# Patient Record
Sex: FEMALE | Race: WHITE
[De-identification: names, ages, dates, MRNs, and addresses within clinical notes are randomized per-mention and may not be internally consistent; named-entity substitution may affect disease eponyms.]

---

## 2020-01-29 ENCOUNTER — HOSPITAL ENCOUNTER (OUTPATIENT)
Age: 60
End: 2020-01-29
Payer: MEDICAID

## 2020-01-29 DIAGNOSIS — G62.9: Primary | ICD-10-CM

## 2020-01-29 PROCEDURE — 95886 MUSC TEST DONE W/N TEST COMP: CPT

## 2020-01-29 PROCEDURE — 95912 NRV CNDJ TEST 11-12 STUDIES: CPT

## 2022-09-08 ENCOUNTER — HOSPITAL ENCOUNTER (EMERGENCY)
Age: 62
LOS: 1 days | Discharge: HOME | End: 2022-09-09
Payer: MEDICAID

## 2022-09-08 VITALS
OXYGEN SATURATION: 99 % | HEART RATE: 86 BPM | TEMPERATURE: 97.16 F | DIASTOLIC BLOOD PRESSURE: 79 MMHG | SYSTOLIC BLOOD PRESSURE: 143 MMHG | RESPIRATION RATE: 15 BRPM

## 2022-09-08 VITALS — BODY MASS INDEX: 21.7 KG/M2

## 2022-09-08 DIAGNOSIS — E87.1: ICD-10-CM

## 2022-09-08 DIAGNOSIS — R10.9: ICD-10-CM

## 2022-09-08 DIAGNOSIS — G43.909: Primary | ICD-10-CM

## 2022-09-08 DIAGNOSIS — E87.6: ICD-10-CM

## 2022-09-08 PROCEDURE — 85025 COMPLETE CBC W/AUTO DIFF WBC: CPT

## 2022-09-08 PROCEDURE — A4216 STERILE WATER/SALINE, 10 ML: HCPCS

## 2022-09-08 PROCEDURE — 99284 EMERGENCY DEPT VISIT MOD MDM: CPT

## 2022-09-08 PROCEDURE — 81001 URINALYSIS AUTO W/SCOPE: CPT

## 2022-09-08 PROCEDURE — 83690 ASSAY OF LIPASE: CPT

## 2022-09-08 PROCEDURE — 80053 COMPREHEN METABOLIC PANEL: CPT

## 2022-09-09 VITALS
HEART RATE: 117 BPM | OXYGEN SATURATION: 98 % | DIASTOLIC BLOOD PRESSURE: 94 MMHG | TEMPERATURE: 98.2 F | RESPIRATION RATE: 17 BRPM | SYSTOLIC BLOOD PRESSURE: 137 MMHG

## 2022-09-09 VITALS — SYSTOLIC BLOOD PRESSURE: 126 MMHG | HEART RATE: 98 BPM | OXYGEN SATURATION: 98 % | DIASTOLIC BLOOD PRESSURE: 73 MMHG

## 2022-09-09 LAB
ALANINE AMINOTRANSFER ALT/SGPT: 27 U/L (ref 13–56)
ALBUMIN SERPL-MCNC: 3.8 G/DL (ref 3.2–5)
ALKALINE PHOSPHATASE: 35 U/L (ref 45–117)
ANION GAP: 11 (ref 5–15)
AST(SGOT): 24 U/L (ref 15–37)
BUN SERPL-MCNC: 11 MG/DL (ref 7–18)
BUN/CREAT RATIO: 13.1 RATIO (ref 10–20)
CALCIUM SERPL-MCNC: 9 MG/DL (ref 8.5–10.1)
CARBON DIOXIDE: 27 MMOL/L (ref 21–32)
CHLORIDE: 95 MMOL/L (ref 98–107)
DEPRECATED RDW RBC: 33.7 FL (ref 35.1–43.9)
ERYTHROCYTE [DISTWIDTH] IN BLOOD: 11 % (ref 11.6–14.6)
EST GLOM FILT RATE - AFR AMER: 88 ML/MIN (ref 60–?)
ESTIMATED CREATININE CLEARANCE: 63.34 ML/MIN
GLOBULIN: 3.2 G/DL (ref 2.2–4.2)
GLUCOSE: 133 MG/DL (ref 74–106)
HCT VFR BLD AUTO: 34.7 % (ref 37–47)
HEMOGLOBIN: 12.4 G/DL (ref 12–15)
HGB BLD-MCNC: 12.4 G/DL (ref 12–15)
IMMATURE GRANULOCYTES COUNT: 0.01 X10^3/UL (ref 0–0)
KETONE-DIPSTICK: 50 MG/DL
LIPASE: 185 U/L (ref 73–393)
MCV RBC: 83.4 FL (ref 81–99)
MEAN CORP HGB CONC: 35.7 G/DL (ref 32–36)
MEAN PLATELET VOL.: 9.7 FL (ref 6.2–12)
MUCOUS THREADS URNS QL MICRO: (no result) /HPF
NRBC FLAGGED BY ANALYZER: 0 % (ref 0–5)
PLATELET # BLD: 178 K/MM3 (ref 150–450)
PLATELET COUNT: 178 K/MM3 (ref 150–450)
POTASSIUM: 3.1 MMOL/L (ref 3.5–5.1)
RBC # BLD AUTO: 4.16 M/MM3 (ref 4.2–5.4)
RBC DISTRIBUTION WIDTH CV: 11 % (ref 11.6–14.6)
RBC DISTRIBUTION WIDTH SD: 33.7 FL (ref 35.1–43.9)
RBC UR QL: (no result) /HPF (ref 0–5)
RBC UR QL: 25 /UL
SP GR UR: 1.02 (ref 1–1.03)
SQUAMOUS URNS QL MICRO: (no result) /HPF (ref 5–10)
URINE PRESERVATIVE: (no result)
WBC # BLD AUTO: 4.4 K/MM3 (ref 4.4–11)
WHITE BLOOD COUNT: 4.4 K/MM3 (ref 4.4–11)

## 2023-05-17 LAB
ALANINE AMINOTRANSFERASE (SGPT) (U/L) IN SER/PLAS: 20 U/L (ref 7–45)
ALBUMIN (G/DL) IN SER/PLAS: 4.6 G/DL (ref 3.4–5)
ALKALINE PHOSPHATASE (U/L) IN SER/PLAS: 40 U/L (ref 33–136)
ANION GAP IN SER/PLAS: 12 MMOL/L (ref 10–20)
ANTI-DNA (DS): 3 IU/ML
ASPARTATE AMINOTRANSFERASE (SGOT) (U/L) IN SER/PLAS: 32 U/L (ref 9–39)
BASOPHILS (10*3/UL) IN BLOOD BY AUTOMATED COUNT: 0.08 X10E9/L (ref 0–0.1)
BASOPHILS/100 LEUKOCYTES IN BLOOD BY AUTOMATED COUNT: 1.6 % (ref 0–2)
BILIRUBIN TOTAL (MG/DL) IN SER/PLAS: 0.7 MG/DL (ref 0–1.2)
C REACTIVE PROTEIN (MG/L) IN SER/PLAS: 0.38 MG/DL
CALCIUM (MG/DL) IN SER/PLAS: 9.7 MG/DL (ref 8.6–10.6)
CARBON DIOXIDE, TOTAL (MMOL/L) IN SER/PLAS: 27 MMOL/L (ref 21–32)
CHLORIDE (MMOL/L) IN SER/PLAS: 104 MMOL/L (ref 98–107)
CHOLESTEROL (MG/DL) IN SER/PLAS: 273 MG/DL (ref 0–199)
CHOLESTEROL IN HDL (MG/DL) IN SER/PLAS: 59 MG/DL
CHOLESTEROL/HDL RATIO: 4.6
COMPLEMENT C3 (MG/DL) IN SER/PLAS: 117 MG/DL (ref 87–200)
COMPLEMENT C4 (MG/DL) IN SER/PLAS: 27 MG/DL (ref 10–50)
CREATININE (MG/DL) IN SER/PLAS: 1.13 MG/DL (ref 0.5–1.05)
EOSINOPHILS (10*3/UL) IN BLOOD BY AUTOMATED COUNT: 0.14 X10E9/L (ref 0–0.7)
EOSINOPHILS/100 LEUKOCYTES IN BLOOD BY AUTOMATED COUNT: 2.8 % (ref 0–6)
ERYTHROCYTE DISTRIBUTION WIDTH (RATIO) BY AUTOMATED COUNT: 11.9 % (ref 11.5–14.5)
ERYTHROCYTE MEAN CORPUSCULAR HEMOGLOBIN CONCENTRATION (G/DL) BY AUTOMATED: 32.6 G/DL (ref 32–36)
ERYTHROCYTE MEAN CORPUSCULAR VOLUME (FL) BY AUTOMATED COUNT: 90 FL (ref 80–100)
ERYTHROCYTES (10*6/UL) IN BLOOD BY AUTOMATED COUNT: 4.27 X10E12/L (ref 4–5.2)
GFR FEMALE: 54 ML/MIN/1.73M2
GLUCOSE (MG/DL) IN SER/PLAS: 88 MG/DL (ref 74–99)
HEMATOCRIT (%) IN BLOOD BY AUTOMATED COUNT: 38.3 % (ref 36–46)
HEMOGLOBIN (G/DL) IN BLOOD: 12.5 G/DL (ref 12–16)
IMMATURE GRANULOCYTES/100 LEUKOCYTES IN BLOOD BY AUTOMATED COUNT: 0.2 % (ref 0–0.9)
LDL: 200 MG/DL (ref 0–99)
LEUKOCYTES (10*3/UL) IN BLOOD BY AUTOMATED COUNT: 5.1 X10E9/L (ref 4.4–11.3)
LYMPHOCYTES (10*3/UL) IN BLOOD BY AUTOMATED COUNT: 1.83 X10E9/L (ref 1.2–4.8)
LYMPHOCYTES/100 LEUKOCYTES IN BLOOD BY AUTOMATED COUNT: 36 % (ref 13–44)
MONOCYTES (10*3/UL) IN BLOOD BY AUTOMATED COUNT: 0.49 X10E9/L (ref 0.1–1)
MONOCYTES/100 LEUKOCYTES IN BLOOD BY AUTOMATED COUNT: 9.6 % (ref 2–10)
NEUTROPHILS (10*3/UL) IN BLOOD BY AUTOMATED COUNT: 2.53 X10E9/L (ref 1.2–7.7)
NEUTROPHILS/100 LEUKOCYTES IN BLOOD BY AUTOMATED COUNT: 49.8 % (ref 40–80)
NRBC (PER 100 WBCS) BY AUTOMATED COUNT: 0 /100 WBC (ref 0–0)
PLATELETS (10*3/UL) IN BLOOD AUTOMATED COUNT: 229 X10E9/L (ref 150–450)
POTASSIUM (MMOL/L) IN SER/PLAS: 4.6 MMOL/L (ref 3.5–5.3)
PROTEIN TOTAL: 7.4 G/DL (ref 6.4–8.2)
SEDIMENTATION RATE, ERYTHROCYTE: 7 MM/H (ref 0–30)
SODIUM (MMOL/L) IN SER/PLAS: 138 MMOL/L (ref 136–145)
TRIGLYCERIDE (MG/DL) IN SER/PLAS: 68 MG/DL (ref 0–149)
UREA NITROGEN (MG/DL) IN SER/PLAS: 26 MG/DL (ref 6–23)
VLDL: 14 MG/DL (ref 0–40)

## 2023-11-18 PROBLEM — Z51.81 ENCOUNTER FOR MONITORING OF HYDROXYCHLOROQUINE THERAPY: Status: ACTIVE | Noted: 2023-11-18

## 2023-11-18 PROBLEM — N30.10 INTERSTITIAL CYSTITIS: Status: ACTIVE | Noted: 2023-11-18

## 2023-11-18 PROBLEM — G62.9 PERIPHERAL NEUROPATHY: Status: ACTIVE | Noted: 2023-11-18

## 2023-11-18 PROBLEM — M70.62 TROCHANTERIC BURSITIS OF LEFT HIP: Status: ACTIVE | Noted: 2023-11-18

## 2023-11-18 PROBLEM — L72.3 SEBACEOUS CYST: Status: ACTIVE | Noted: 2018-06-29

## 2023-11-18 PROBLEM — Z79.899 ENCOUNTER FOR MONITORING OF HYDROXYCHLOROQUINE THERAPY: Status: ACTIVE | Noted: 2023-11-18

## 2023-11-18 PROBLEM — R21 RASH AND OTHER NONSPECIFIC SKIN ERUPTION: Status: ACTIVE | Noted: 2018-06-29

## 2023-11-18 PROBLEM — M32.9 SYSTEMIC LUPUS ERYTHEMATOSUS (MULTI): Status: ACTIVE | Noted: 2023-11-18

## 2023-11-18 PROBLEM — K21.9 GASTROESOPHAGEAL REFLUX DISEASE: Status: ACTIVE | Noted: 2023-11-18

## 2023-11-18 PROBLEM — R76.8 SS-A ANTIBODY POSITIVE: Status: ACTIVE | Noted: 2023-11-18

## 2023-11-18 PROBLEM — K14.6 BURNING MOUTH SYNDROME: Status: ACTIVE | Noted: 2023-11-18

## 2023-11-18 RX ORDER — HYDROXYCHLOROQUINE SULFATE 200 MG/1
1 TABLET, FILM COATED ORAL DAILY
COMMUNITY
Start: 2018-11-12 | End: 2024-05-14

## 2023-11-18 RX ORDER — ACETAMINOPHEN 500 MG
TABLET ORAL
COMMUNITY
Start: 2019-05-01

## 2023-11-18 RX ORDER — GABAPENTIN 300 MG/1
600 CAPSULE ORAL
COMMUNITY
Start: 2019-06-19 | End: 2023-11-20 | Stop reason: SDUPTHER

## 2023-11-18 RX ORDER — PANTOPRAZOLE SODIUM 40 MG/1
1 TABLET, DELAYED RELEASE ORAL DAILY
COMMUNITY
Start: 2021-05-17

## 2023-11-20 ENCOUNTER — TELEPHONE (OUTPATIENT)
Dept: RHEUMATOLOGY | Facility: CLINIC | Age: 63
End: 2023-11-20

## 2023-11-20 ENCOUNTER — OFFICE VISIT (OUTPATIENT)
Dept: RHEUMATOLOGY | Facility: CLINIC | Age: 63
End: 2023-11-20
Payer: COMMERCIAL

## 2023-11-20 VITALS
SYSTOLIC BLOOD PRESSURE: 133 MMHG | TEMPERATURE: 98.1 F | WEIGHT: 142.7 LBS | BODY MASS INDEX: 23.78 KG/M2 | HEART RATE: 89 BPM | DIASTOLIC BLOOD PRESSURE: 82 MMHG | HEIGHT: 65 IN

## 2023-11-20 DIAGNOSIS — M32.9 SYSTEMIC LUPUS ERYTHEMATOSUS, UNSPECIFIED SLE TYPE, UNSPECIFIED ORGAN INVOLVEMENT STATUS (MULTI): Primary | ICD-10-CM

## 2023-11-20 DIAGNOSIS — Z79.899 ENCOUNTER FOR MONITORING OF HYDROXYCHLOROQUINE THERAPY: ICD-10-CM

## 2023-11-20 DIAGNOSIS — K14.6 BURNING MOUTH SYNDROME: Primary | ICD-10-CM

## 2023-11-20 DIAGNOSIS — Z51.81 ENCOUNTER FOR MONITORING OF HYDROXYCHLOROQUINE THERAPY: ICD-10-CM

## 2023-11-20 LAB
NON-UH HIE A/G RATIO: 1.4
NON-UH HIE ALB: 4 G/DL (ref 3.4–5)
NON-UH HIE ALK PHOS: 43 UNIT/L (ref 45–117)
NON-UH HIE BASO COUNT: 0.02 X1000 (ref 0–0.2)
NON-UH HIE BASOS %: 0.6 %
NON-UH HIE BILIRUBIN, TOTAL: 0.5 MG/DL (ref 0.3–1.2)
NON-UH HIE BUN/CREAT RATIO: 13.6
NON-UH HIE BUN: 15 MG/DL (ref 9–23)
NON-UH HIE C-REACTIVE PROTEIN, QUANTITATIVE: <0.4 MG/DL (ref 0–0.9)
NON-UH HIE CALCIUM: 9.4 MG/DL (ref 8.7–10.4)
NON-UH HIE CALCULATED OSMOLALITY: 280 MOSM/KG (ref 275–295)
NON-UH HIE CHLORIDE: 104 MMOL/L (ref 98–107)
NON-UH HIE CO2, VENOUS: 30 MMOL/L (ref 20–31)
NON-UH HIE CREATININE: 1.1 MG/DL (ref 0.5–0.8)
NON-UH HIE DIFF?: NO
NON-UH HIE EOS COUNT: 0.07 X1000 (ref 0–0.5)
NON-UH HIE EOSIN %: 1.6 %
NON-UH HIE GFR AA: >60
NON-UH HIE GLOBULIN: 2.9 G/DL
NON-UH HIE GLOMERULAR FILTRATION RATE: 50 ML/MIN/1.73M?
NON-UH HIE GLUCOSE: 92 MG/DL (ref 74–106)
NON-UH HIE GOT: 30 UNIT/L (ref 15–37)
NON-UH HIE GPT: 32 UNIT/L (ref 10–49)
NON-UH HIE HCT: 36.3 % (ref 36–46)
NON-UH HIE HGB: 12.2 G/DL (ref 12–16)
NON-UH HIE INSTR WBC: 4.2
NON-UH HIE K: 4.6 MMOL/L (ref 3.5–5.1)
NON-UH HIE LYMPH %: 26.6 %
NON-UH HIE LYMPH COUNT: 1.11 X1000 (ref 1.2–4.8)
NON-UH HIE MCH: 29.6 PG (ref 27–34)
NON-UH HIE MCHC: 33.8 G/DL (ref 32–37)
NON-UH HIE MCV: 87.6 FL (ref 80–100)
NON-UH HIE MONO %: 7.2 %
NON-UH HIE MONO COUNT: 0.3 X1000 (ref 0.1–1)
NON-UH HIE MPV: 8.6 FL (ref 7.4–10.4)
NON-UH HIE NA: 140 MMOL/L (ref 135–145)
NON-UH HIE NEUTROPHIL %: 64 %
NON-UH HIE NEUTROPHIL COUNT (ANC): 2.66 X1000 (ref 1.4–8.8)
NON-UH HIE NUCLEATED RBC: 0 /100WBC
NON-UH HIE PLATELET: 180 X10 (ref 150–450)
NON-UH HIE RBC: 4.14 X10 (ref 4.2–5.4)
NON-UH HIE RDW: 12.6 % (ref 11.5–14.5)
NON-UH HIE SED RATE WESTERGREN: 11 MM/HR (ref 0–30)
NON-UH HIE TOTAL PROTEIN: 6.9 G/DL (ref 5.7–8.2)
NON-UH HIE WBC: 4.2 X10 (ref 4.5–11)

## 2023-11-20 PROCEDURE — 99214 OFFICE O/P EST MOD 30 MIN: CPT | Performed by: INTERNAL MEDICINE

## 2023-11-20 PROCEDURE — 1036F TOBACCO NON-USER: CPT | Performed by: INTERNAL MEDICINE

## 2023-11-20 PROCEDURE — 1125F AMNT PAIN NOTED PAIN PRSNT: CPT | Performed by: INTERNAL MEDICINE

## 2023-11-20 PROCEDURE — 1160F RVW MEDS BY RX/DR IN RCRD: CPT | Performed by: INTERNAL MEDICINE

## 2023-11-20 PROCEDURE — 1159F MED LIST DOCD IN RCRD: CPT | Performed by: INTERNAL MEDICINE

## 2023-11-20 RX ORDER — GABAPENTIN 300 MG/1
600 CAPSULE ORAL
Qty: 60 CAPSULE | Refills: 11 | Status: SHIPPED | OUTPATIENT
Start: 2023-11-20 | End: 2023-12-06 | Stop reason: SDUPTHER

## 2023-11-20 ASSESSMENT — ENCOUNTER SYMPTOMS
NUMBNESS: 0
BACK PAIN: 0
MYALGIAS: 0
FATIGUE: 1
ARTHRALGIAS: 1
FEVER: 0
COLOR CHANGE: 0
BRUISES/BLEEDS EASILY: 0
DIZZINESS: 0
JOINT SWELLING: 0
HEADACHES: 0
COUGH: 0
UNEXPECTED WEIGHT CHANGE: 0
WEAKNESS: 0
SHORTNESS OF BREATH: 0

## 2023-11-20 ASSESSMENT — PATIENT HEALTH QUESTIONNAIRE - PHQ9
2. FEELING DOWN, DEPRESSED OR HOPELESS: NOT AT ALL
SUM OF ALL RESPONSES TO PHQ9 QUESTIONS 1 AND 2: 0
1. LITTLE INTEREST OR PLEASURE IN DOING THINGS: NOT AT ALL

## 2023-11-20 NOTE — PATIENT INSTRUCTIONS
It was a pleasure to see you today  Please call if your symptoms worsen  Please review your summary for education and reminders.  Follow up at your next appointment.    If you had labs/xrays done today, you will be able to view on [employer].   We will contact you when the results are reviewed for further discussion.  Please note that you may receive your results before I have had a chance to review.  Please know I will be contacting you for discussion  Homegoing instructions for all patient  A healthy lifestyle helps chronic diseases  These are all the goals you should strive to improve your overall health   Blood pressure <130/85   BMI of <30 or waist circumference that is 1/2 of your height   Fasting blood sugar <107 (if you are diabetic, aim for an A1c <6.4%_   LDL cholesterol <130   Avoid smoking   Manage your stress   Get your preventive exams   Get your immunizations

## 2023-11-20 NOTE — PROGRESS NOTES
Subjective   Patient ID: Romana Hogan is a 63 y.o. female who presents for Lupus.  Pt reports she has been stable for the most part.   Has fatigue but she thinks it may be because she watches her young grandchildren.    She notes she does get a rash on her face when she is tired.  Reports daughter is being evaluated for an autoimmune disease as well    Lupus  Associated symptoms include arthralgias, fatigue and a rash. Pertinent negatives include no congestion, coughing, fever, headaches, joint swelling, myalgias, numbness or weakness.     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    Gastroesophageal reflux disease 11/18/2023    Interstitial cystitis 11/18/2023    Peripheral neuropathy 11/18/2023    SS-A antibody positive 11/18/2023    Systemic lupus erythematosus (CMS/HCC) 11/18/2023    Trochanteric bursitis of left hip 11/18/2023    Encounter for monitoring of hydroxychloroquine therapy 11/18/2023    Rash and other nonspecific skin eruption 06/29/2018    Sebaceous cyst 06/29/2018     Allergies   Allergen Reactions    Pollen Extracts Other     Sneezing, watery eyes    Latex Rash     Current Outpatient Medications   Medication Instructions    acetaminophen (Tylenol Extra Strength) 500 mg tablet oral    gabapentin (NEURONTIN) 600 mg, oral, Daily before breakfast, And 1 tablet in evening    hydroxychloroquine (Plaquenil) 200 mg tablet 1 tablet, oral, Daily    pantoprazole (Protonix) 40 mg EC tablet 1 tablet, oral, Daily    phenylephrine-acetaminophen 5-325 mg tablet oral, Every 4 hours RT       Review of Systems   Constitutional:  Positive for fatigue. Negative for fever and unexpected weight change.   HENT:  Negative for congestion.    Respiratory:  Negative for cough and shortness of breath.    Cardiovascular:  Negative for leg swelling.   Musculoskeletal:  Positive for arthralgias. Negative for back pain, joint swelling and myalgias.   Skin:  Positive for rash.  "Negative for color change.   Neurological:  Negative for dizziness, weakness, numbness and headaches.   Hematological:  Does not bruise/bleed easily.       Objective  /82   Pulse 89   Temp 36.7 °C (98.1 °F)   Ht 1.651 m (5' 5\")   Wt 64.7 kg (142 lb 11.2 oz)   BMI 23.75 kg/m²     Physical Exam  Vitals reviewed.   Constitutional:       General: She is not in acute distress.     Appearance: Normal appearance.   HENT:      Head: Normocephalic and atraumatic.   Eyes:      Conjunctiva/sclera: Conjunctivae normal.   Pulmonary:      Effort: Pulmonary effort is normal. No respiratory distress.   Musculoskeletal:         General: No swelling, tenderness or deformity.      Right lower leg: No edema.      Left lower leg: No edema.      Comments: No synovitis of examined joints   Skin:     Findings: Erythema present. No rash.      Comments: Malar rash   Neurological:      General: No focal deficit present.      Mental Status: She is alert.   Psychiatric:         Mood and Affect: Mood normal.         Assessment/Plan   Problem List Items Addressed This Visit             ICD-10-CM    Systemic lupus erythematosus (CMS/[facility]) - Primary M32.9     Lupus on HCQ therapy.   Pt is largely stable and symptoms may be due to overactivity.  Continue meds  Labs ordered today to monitor for increased disease activity, end organ manifestations and to monitor for any drug toxicities due to chronic medications           Relevant Orders    Anti-DNA Antibody, Double-Stranded    C3 Complement    C4 Complement    CBC and Auto Differential    Comprehensive Metabolic Panel    C-Reactive Protein    Sedimentation Rate    Encounter for monitoring of hydroxychloroquine therapy Z51.81, Z79.899     You are on chronic plaquenil.     Make sure you see your eye doctor yearly.  If you need an eye doctor, let me know and I can place a referral    Plaquenil is dosed based on your weight.   We will make sure your dose is below the maximum daily dose of " 5mg/kg            Follow up 6 mo

## 2023-11-20 NOTE — LETTER
November 20, 2023     Mattie Escamilla MD  [address] [facility] [address]    Patient: Romana Hogan   YOB: 1960   Date of Visit: 11/20/2023       Dear Dr. Mattie Escamilla MD:    Thank you for referring Romana Hogan to me for evaluation. Below are my notes for this consultation.  If you have questions, please do not hesitate to call me. I look forward to following your patient along with you.       Sincerely,     Leah Galindo MD      CC: No Recipients  ______________________________________________________________________________________    Subjective  Patient ID: Romana Hogan is a 63 y.o. female who presents for Lupus.  Pt reports she has been stable for the most part.   Has fatigue but she thinks it may be because she watches her young grandchildren.    She notes she does get a rash on her face when she is tired.  Reports daughter is being evaluated for an autoimmune disease as well    Lupus  Associated symptoms include arthralgias, fatigue and a rash. Pertinent negatives include no congestion, coughing, fever, headaches, joint swelling, myalgias, numbness or weakness.     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   • Gastroesophageal reflux disease 11/18/2023   • Interstitial cystitis 11/18/2023   • Peripheral neuropathy 11/18/2023   • SS-A antibody positive 11/18/2023   • Systemic lupus erythematosus (CMS/HCC) 11/18/2023   • Trochanteric bursitis of left hip 11/18/2023   • Encounter for monitoring of hydroxychloroquine therapy 11/18/2023   • Rash and other nonspecific skin eruption 06/29/2018   • Sebaceous cyst 06/29/2018     Allergies   Allergen Reactions   • Pollen Extracts Other     Sneezing, watery eyes   • Latex Rash     Current Outpatient Medications   Medication Instructions   • acetaminophen (Tylenol Extra Strength) 500 mg tablet oral   • gabapentin (NEURONTIN) 600 mg, oral, Daily before breakfast, And 1 tablet in evening   •  "hydroxychloroquine (Plaquenil) 200 mg tablet 1 tablet, oral, Daily   • pantoprazole (Protonix) 40 mg EC tablet 1 tablet, oral, Daily   • phenylephrine-acetaminophen 5-325 mg tablet oral, Every 4 hours RT       Review of Systems   Constitutional:  Positive for fatigue. Negative for fever and unexpected weight change.   HENT:  Negative for congestion.    Respiratory:  Negative for cough and shortness of breath.    Cardiovascular:  Negative for leg swelling.   Musculoskeletal:  Positive for arthralgias. Negative for back pain, joint swelling and myalgias.   Skin:  Positive for rash. Negative for color change.   Neurological:  Negative for dizziness, weakness, numbness and headaches.   Hematological:  Does not bruise/bleed easily.       Objective /82   Pulse 89   Temp 36.7 °C (98.1 °F)   Ht 1.651 m (5' 5\")   Wt 64.7 kg (142 lb 11.2 oz)   BMI 23.75 kg/m²     Physical Exam  Vitals reviewed.   Constitutional:       General: She is not in acute distress.     Appearance: Normal appearance.   HENT:      Head: Normocephalic and atraumatic.   Eyes:      Conjunctiva/sclera: Conjunctivae normal.   Pulmonary:      Effort: Pulmonary effort is normal. No respiratory distress.   Musculoskeletal:         General: No swelling, tenderness or deformity.      Right lower leg: No edema.      Left lower leg: No edema.      Comments: No synovitis of examined joints   Skin:     Findings: Erythema present. No rash.      Comments: Malar rash   Neurological:      General: No focal deficit present.      Mental Status: She is alert.   Psychiatric:         Mood and Affect: Mood normal.         Assessment/Plan  Problem List Items Addressed This Visit             ICD-10-CM    Systemic lupus erythematosus (CMS/[facility]) - Primary M32.9     Lupus on HCQ therapy.   Pt is largely stable and symptoms may be due to overactivity.  Continue meds  Labs ordered today to monitor for increased disease activity, end organ manifestations and to monitor for " any drug toxicities due to chronic medications           Relevant Orders    Anti-DNA Antibody, Double-Stranded    C3 Complement    C4 Complement    CBC and Auto Differential    Comprehensive Metabolic Panel    C-Reactive Protein    Sedimentation Rate    Encounter for monitoring of hydroxychloroquine therapy Z51.81, Z79.899     You are on chronic plaquenil.     Make sure you see your eye doctor yearly.  If you need an eye doctor, let me know and I can place a referral    Plaquenil is dosed based on your weight.   We will make sure your dose is below the maximum daily dose of 5mg/kg            Follow up 6 mo

## 2023-11-20 NOTE — ASSESSMENT & PLAN NOTE
Lupus on HCQ therapy.   Pt is largely stable and symptoms may be due to overactivity.  Continue meds  Labs ordered today to monitor for increased disease activity, end organ manifestations and to monitor for any drug toxicities due to chronic medications

## 2023-11-21 LAB — NON-UH HIE ANTI-DNA: NEGATIVE

## 2023-11-23 LAB
NON-UH HIE COMPLEMENT C3: 101 MG/DL (ref 90–180)
NON-UH HIE COMPLEMENT C4: 16 MG/DL (ref 10–40)

## 2023-12-06 ENCOUNTER — TELEPHONE (OUTPATIENT)
Dept: RHEUMATOLOGY | Facility: CLINIC | Age: 63
End: 2023-12-06
Payer: COMMERCIAL

## 2023-12-06 DIAGNOSIS — K14.6 BURNING MOUTH SYNDROME: ICD-10-CM

## 2023-12-06 RX ORDER — GABAPENTIN 300 MG/1
600 CAPSULE ORAL
Qty: 90 CAPSULE | Refills: 11 | Status: SHIPPED | OUTPATIENT
Start: 2023-12-06 | End: 2024-12-05

## 2023-12-06 NOTE — TELEPHONE ENCOUNTER
Romana called in saying that when her gabapentin was called in on 11/20/2023 she on received #60 and she normally gets #90 since she takes it TID. Can you please resend the medication to [facility] on [address] [facility] in [place], [place]. She only has 10 days left. Thanks.

## 2024-05-14 DIAGNOSIS — M32.9 SYSTEMIC LUPUS ERYTHEMATOSUS, UNSPECIFIED SLE TYPE, UNSPECIFIED ORGAN INVOLVEMENT STATUS (MULTI): Primary | ICD-10-CM

## 2024-05-14 RX ORDER — HYDROXYCHLOROQUINE SULFATE 200 MG/1
200 TABLET, FILM COATED ORAL DAILY
Qty: 90 TABLET | Refills: 3 | Status: SHIPPED | OUTPATIENT
Start: 2024-05-14 | End: 2025-05-14

## 2024-05-20 ENCOUNTER — APPOINTMENT (OUTPATIENT)
Dept: RHEUMATOLOGY | Facility: CLINIC | Age: 64
End: 2024-05-20
Payer: COMMERCIAL

## 2024-09-22 RX ORDER — FLUTICASONE PROPIONATE 50 MCG
2 SPRAY, SUSPENSION (ML) NASAL DAILY
COMMUNITY
Start: 2024-04-11

## 2024-09-22 RX ORDER — TRIAMCINOLONE ACETONIDE 1 MG/G
CREAM TOPICAL 2 TIMES DAILY
COMMUNITY
Start: 2024-04-11

## 2024-09-25 ENCOUNTER — APPOINTMENT (OUTPATIENT)
Dept: RHEUMATOLOGY | Facility: CLINIC | Age: 64
End: 2024-09-25
Payer: COMMERCIAL

## 2024-09-25 ENCOUNTER — LAB (OUTPATIENT)
Dept: LAB | Facility: LAB | Age: 64
End: 2024-09-25
Payer: COMMERCIAL

## 2024-09-25 VITALS
SYSTOLIC BLOOD PRESSURE: 137 MMHG | WEIGHT: 142.9 LBS | OXYGEN SATURATION: 99 % | TEMPERATURE: 98 F | HEIGHT: 65 IN | DIASTOLIC BLOOD PRESSURE: 85 MMHG | HEART RATE: 75 BPM | BODY MASS INDEX: 23.81 KG/M2

## 2024-09-25 DIAGNOSIS — Z51.81 ENCOUNTER FOR MONITORING OF HYDROXYCHLOROQUINE THERAPY: ICD-10-CM

## 2024-09-25 DIAGNOSIS — M32.9 SYSTEMIC LUPUS ERYTHEMATOSUS, UNSPECIFIED SLE TYPE, UNSPECIFIED ORGAN INVOLVEMENT STATUS (MULTI): Primary | ICD-10-CM

## 2024-09-25 DIAGNOSIS — Z79.899 ENCOUNTER FOR MONITORING OF HYDROXYCHLOROQUINE THERAPY: ICD-10-CM

## 2024-09-25 DIAGNOSIS — Z23 NEED FOR VACCINATION: ICD-10-CM

## 2024-09-25 DIAGNOSIS — M32.9 SYSTEMIC LUPUS ERYTHEMATOSUS, UNSPECIFIED SLE TYPE, UNSPECIFIED ORGAN INVOLVEMENT STATUS (MULTI): ICD-10-CM

## 2024-09-25 PROBLEM — H25.811 COMBINED FORMS OF AGE-RELATED CATARACT, RIGHT EYE: Status: ACTIVE | Noted: 2024-09-25

## 2024-09-25 PROCEDURE — 90662 IIV NO PRSV INCREASED AG IM: CPT | Performed by: INTERNAL MEDICINE

## 2024-09-25 PROCEDURE — 85025 COMPLETE CBC W/AUTO DIFF WBC: CPT

## 2024-09-25 PROCEDURE — 86160 COMPLEMENT ANTIGEN: CPT

## 2024-09-25 PROCEDURE — 86140 C-REACTIVE PROTEIN: CPT

## 2024-09-25 PROCEDURE — 86225 DNA ANTIBODY NATIVE: CPT

## 2024-09-25 PROCEDURE — 80053 COMPREHEN METABOLIC PANEL: CPT

## 2024-09-25 PROCEDURE — 99214 OFFICE O/P EST MOD 30 MIN: CPT | Performed by: INTERNAL MEDICINE

## 2024-09-25 PROCEDURE — 36415 COLL VENOUS BLD VENIPUNCTURE: CPT

## 2024-09-25 PROCEDURE — 1036F TOBACCO NON-USER: CPT | Performed by: INTERNAL MEDICINE

## 2024-09-25 PROCEDURE — 1159F MED LIST DOCD IN RCRD: CPT | Performed by: INTERNAL MEDICINE

## 2024-09-25 PROCEDURE — 3008F BODY MASS INDEX DOCD: CPT | Performed by: INTERNAL MEDICINE

## 2024-09-25 PROCEDURE — 1124F ACP DISCUSS-NO DSCNMKR DOCD: CPT | Performed by: INTERNAL MEDICINE

## 2024-09-25 PROCEDURE — G0008 ADMIN INFLUENZA VIRUS VAC: HCPCS | Performed by: INTERNAL MEDICINE

## 2024-09-25 PROCEDURE — 85652 RBC SED RATE AUTOMATED: CPT

## 2024-09-25 PROCEDURE — 1160F RVW MEDS BY RX/DR IN RCRD: CPT | Performed by: INTERNAL MEDICINE

## 2024-09-25 ASSESSMENT — ENCOUNTER SYMPTOMS
WEAKNESS: 0
NUMBNESS: 0
JOINT SWELLING: 0
SHORTNESS OF BREATH: 0
COLOR CHANGE: 0
BACK PAIN: 0
FATIGUE: 0
COUGH: 0
FEVER: 0
ARTHRALGIAS: 1
MYALGIAS: 0

## 2024-09-25 ASSESSMENT — PATIENT HEALTH QUESTIONNAIRE - PHQ9
2. FEELING DOWN, DEPRESSED OR HOPELESS: NOT AT ALL
SUM OF ALL RESPONSES TO PHQ9 QUESTIONS 1 & 2: 0
1. LITTLE INTEREST OR PLEASURE IN DOING THINGS: NOT AT ALL

## 2024-09-25 NOTE — PATIENT INSTRUCTIONS
It was a pleasure to see you today  Please call if your symptoms worsen  Please review your summary for education and reminders.  Follow up at your next appointment.    If you had labs/xrays done today, you will be able to view on [employer].   We will contact you when the results are reviewed for further discussion.  Please note that you may receive your results before I have had a chance to review.  Please know I will be contacting you for discussion  Homegoing instructions for all patient  A healthy lifestyle helps chronic diseases  These are all the goals you should strive to improve your overall health   Blood pressure <130/85   BMI of <30 or waist circumference that is 1/2 of your height   Fasting blood sugar <107 (if you are diabetic, aim for an A1c <6.4%_   LDL cholesterol <130   Avoid smoking   Manage your stress   Get your preventive exams   Get your immunizations   Some guidelines for all patients with lupus  Wear sunscreen, even on cloudy days.   Sun can worsening rashes and cause fatigue and flares  Exercise.  Movement is medicine.   Even stretches and light yoga count  If you smoke, stop.  Smoking makes skin manifestations of lupus much harder to treat.  Lupus (and the medications you need to be on) increase infection risk.  Keep up to date with your vaccinations  Lupus increases heart and stroke risk.  Make sure you work to get your cholesterol and blood pressure under control. If you have diabetes, it is important to get your A1c below 7%  If you are on hydroxychloroquine (plaquenil), make sure you see your eye doctor regularly and get testing to monitor for any potential changes  Think about your bone health.   Osteoporosis is a risk, especially if you are or have been on steroids.      Mental health can be an issue.   Not only does having a diagnosis of lupus cause depression but lupus itself can be the cause of mental health issues.  If you feel you need help, please call me or your primary care  doctor.   We are here for you   Common Emergency Awareness Tips   IS IT A STROKE?   Act FAST and Check for these signs:   FACE Does the face look uneven?   ARM Does one arm drift down?   SPEECH Does their speech sound strange?   TIME Call 9-1-1 at any sign of stroke     Heart Attack Signs   Chest discomfort: Most heart attacks involve discomfort in the center of the chest and lasts more than a few minutes, or goes away and comes back. It can feel like uncomfortable pressure, squeezing, fullness or pain.   Discomfort in upper body: Symptoms can include pain or discomfort in one or both arms, back, neck, jaw or stomach.   Shortness of breath: With or without discomfort.   Other signs: Breaking out in a cold sweat, nausea, or lightheaded.   Remember, MINUTES DO MATTER. If you experience any of these heart attack warning signs, call 9-1-1 to get immediate medical attention!

## 2024-09-25 NOTE — ASSESSMENT & PLAN NOTE
You are on chronic plaquenil.     Make sure you see your eye doctor yearly.---last visit May 2024  Plaquenil is dosed based on your weight.   We will make sure your dose is below the maximum daily dose of 5mg/kg

## 2024-09-25 NOTE — PROGRESS NOTES
[facility] RHEUMATOLOGY     AND INTERNAL MEDICINE    RHEUMATOLOGY PROGRESS NOTE  Romana Hogan 64 y.o. female  Chief Complaint   Patient presents with    Follow-up    Lupus       SUBJECTIVE  Pt reports she has been stable.   Notes that with sun exposure, she gets small itchy red bumps that come and go very quickly.   Hasn't had any in 2 weeks.   Doesn't always use sunscreen.  No new areas of pain.   No worsening fatigue.  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    Gastroesophageal reflux disease 11/18/2023    Interstitial cystitis 11/18/2023    Peripheral neuropathy 11/18/2023    SS-A antibody positive 11/18/2023    Systemic lupus erythematosus (Multi) 11/18/2023    Trochanteric bursitis of left hip 11/18/2023    Encounter for monitoring of hydroxychloroquine therapy 11/18/2023    Rash and other nonspecific skin eruption 06/29/2018    Sebaceous cyst 06/29/2018     Past Medical History:   Diagnosis Date    Caffeine use     COVID-19 vaccine series declined     Pneumonia     History of pneumonia    Sleep disorder     History of sleep disorder     Current Outpatient Medications on File Prior to Visit   Medication Sig Dispense Refill    acetaminophen (Tylenol Extra Strength) 500 mg tablet Take by mouth.      fluticasone (Flonase) 50 mcg/actuation nasal spray Administer 2 sprays into each nostril once daily.      gabapentin (Neurontin) 300 mg capsule Take 2 capsules (600 mg) by mouth once daily in the morning. Take before meals. And 1 tablet in evening 90 capsule 11    hydroxychloroquine (Plaquenil) 200 mg tablet Take 1 tablet (200 mg) by mouth once  "daily. 90 tablet 3    pantoprazole (Protonix) 40 mg EC tablet Take 1 tablet (40 mg) by mouth once daily.      triamcinolone (Kenalog) 0.1 % cream Apply topically 2 times a day.      [DISCONTINUED] phenylephrine-acetaminophen 5-325 mg tablet Take by mouth every 4 hours.       No current facility-administered medications on file prior to visit.     Allergies   Allergen Reactions    Pollen Extracts Other     Sneezing, watery eyes    Latex Rash     Social History     Tobacco Use    Smoking status: Former     Current packs/day: 0.00     Types: Cigarettes     Quit date: 1984     Years since quittin.0    Smokeless tobacco: Never   Vaping Use    Vaping status: Never Used   Substance Use Topics    Alcohol use: Never    Drug use: Never     Review of Systems   Constitutional:  Negative for fatigue and fever.   Respiratory:  Negative for cough and shortness of breath.    Cardiovascular:  Negative for leg swelling.   Musculoskeletal:  Positive for arthralgias. Negative for back pain, gait problem, joint swelling and myalgias.   Skin:  Positive for rash. Negative for color change.   Neurological:  Negative for weakness and numbness.       PHYSICAL EXAM  /85   Pulse 75   Temp 36.7 °C (98 °F) (Temporal)   Ht 1.651 m (5' 5\")   Wt 64.8 kg (142 lb 14.4 oz)   SpO2 99%   BMI 23.78 kg/m²   Depression: Not at risk (2024)    PHQ-2     PHQ-2 Score: 0     Physical Exam  Vitals reviewed.   Constitutional:       General: She is not in acute distress.     Appearance: Normal appearance.   HENT:      Head: Normocephalic and atraumatic.   Eyes:      Conjunctiva/sclera: Conjunctivae normal.   Pulmonary:      Effort: Pulmonary effort is normal. No respiratory distress.   Musculoskeletal:         General: No swelling, tenderness or deformity.      Right lower leg: No edema.      Left lower leg: No edema.      Comments: No synovitis of examined joints   Skin:     Findings: Erythema present. No rash.      Comments: Malar " rash; no other rashes   Neurological:      General: No focal deficit present.      Mental Status: She is alert.   Psychiatric:         Mood and Affect: Mood normal.       Health Maintenance Due   Topic Date Due    Medicare Annual Wellness Visit (AWV)  Never done    Hepatitis C Screening  Never done    Zoster Vaccines (1 of 2) Never done    RSV Pregnant patients and/or  patients aged 60+ years (1 - 1-dose 60+ series) Never done    Diabetes Screening  10/31/2020    Influenza Vaccine (1) 09/01/2024       Assessment/plan  Assessment & Plan  Systemic lupus erythematosus, unspecified SLE type, unspecified organ involvement status (Multi)  Lupus is stable.   No signs of disease progression on exam.   Pt to use sunscreen more regularly.  Labs ordered today to monitor for increased disease activity, end organ manifestations and to monitor for any drug toxicities due to chronic medications    Orders:    Anti-DNA Antibody, Double-Stranded; Future    C3 Complement; Future    C4 Complement; Future    CBC and Auto Differential; Future    Comprehensive Metabolic Panel; Future    C-Reactive Protein; Future    Sedimentation Rate; Future    Encounter for monitoring of hydroxychloroquine therapy  You are on chronic plaquenil.     Make sure you see your eye doctor yearly.---last visit May 2024  Plaquenil is dosed based on your weight.   We will make sure your dose is below the maximum daily dose of 5mg/kg         Need for vaccination    Orders:    Flu vaccine, trivalent, preservative free, HIGH-DOSE, age 65y+ (Fluzone)      Follow up: ___6__months    Immunization History   Administered Date(s) Administered    Flu vaccine (IIV4), preservative free *Check age/dose* 11/18/2022    Flu vaccine, quadrivalent, high-dose, preservative free, age 65y+ (FLUZONE) 12/20/2023    Pneumococcal conjugate vaccine, 20-valent (PREVNAR 20) 08/09/2022    Tdap vaccine, age 7 year and older (BOOSTRIX, ADACEL) 08/29/2018

## 2024-09-25 NOTE — ASSESSMENT & PLAN NOTE
Lupus is stable.   No signs of disease progression on exam.   Pt to use sunscreen more regularly.  Labs ordered today to monitor for increased disease activity, end organ manifestations and to monitor for any drug toxicities due to chronic medications    Orders:    Anti-DNA Antibody, Double-Stranded; Future    C3 Complement; Future    C4 Complement; Future    CBC and Auto Differential; Future    Comprehensive Metabolic Panel; Future    C-Reactive Protein; Future    Sedimentation Rate; Future

## 2024-09-25 NOTE — LETTER
September 25, 2024     Mattie Escamilla MD  [address] [facility] [address]    Patient: Romana Hogan   YOB: 1960   Date of Visit: 9/25/2024       Dear Dr. Mattie Escamilla MD:    Thank you for referring Romana Hogan to me for evaluation. Below are my notes for this visit.  If you have questions, please do not hesitate to call me. I look forward to following your patient along with you.       Sincerely,     Leah Galindo MD      CC: No Recipients  ______________________________________________________________________________________                                                    [facility] RHEUMATOLOGY     AND INTERNAL MEDICINE    RHEUMATOLOGY PROGRESS NOTE  Romana Hogan 64 y.o. female  Chief Complaint   Patient presents with   • Follow-up   • Lupus       SUBJECTIVE  Pt reports she has been stable.   Notes that with sun exposure, she gets small itchy red bumps that come and go very quickly.   Hasn't had any in 2 weeks.   Doesn't always use sunscreen.  No new areas of pain.   No worsening fatigue.  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   • Gastroesophageal reflux disease 11/18/2023   • Interstitial cystitis 11/18/2023   • Peripheral neuropathy 11/18/2023   • SS-A antibody positive 11/18/2023   • Systemic lupus erythematosus (Multi) 11/18/2023   • Trochanteric bursitis of left hip 11/18/2023   • Encounter for monitoring of hydroxychloroquine therapy 11/18/2023   • Rash and other nonspecific skin eruption 06/29/2018   • Sebaceous cyst 06/29/2018     Past Medical History:   Diagnosis Date   • Caffeine use    • COVID-19 vaccine series declined    • Pneumonia      "History of pneumonia   • Sleep disorder     History of sleep disorder     Current Outpatient Medications on File Prior to Visit   Medication Sig Dispense Refill   • acetaminophen (Tylenol Extra Strength) 500 mg tablet Take by mouth.     • fluticasone (Flonase) 50 mcg/actuation nasal spray Administer 2 sprays into each nostril once daily.     • gabapentin (Neurontin) 300 mg capsule Take 2 capsules (600 mg) by mouth once daily in the morning. Take before meals. And 1 tablet in evening 90 capsule 11   • hydroxychloroquine (Plaquenil) 200 mg tablet Take 1 tablet (200 mg) by mouth once daily. 90 tablet 3   • pantoprazole (Protonix) 40 mg EC tablet Take 1 tablet (40 mg) by mouth once daily.     • triamcinolone (Kenalog) 0.1 % cream Apply topically 2 times a day.     • [DISCONTINUED] phenylephrine-acetaminophen 5-325 mg tablet Take by mouth every 4 hours.       No current facility-administered medications on file prior to visit.     Allergies   Allergen Reactions   • Pollen Extracts Other     Sneezing, watery eyes   • Latex Rash     Social History     Tobacco Use   • Smoking status: Former     Current packs/day: 0.00     Types: Cigarettes     Quit date: 1984     Years since quittin.0   • Smokeless tobacco: Never   Vaping Use   • Vaping status: Never Used   Substance Use Topics   • Alcohol use: Never   • Drug use: Never     Review of Systems   Constitutional:  Negative for fatigue and fever.   Respiratory:  Negative for cough and shortness of breath.    Cardiovascular:  Negative for leg swelling.   Musculoskeletal:  Positive for arthralgias. Negative for back pain, gait problem, joint swelling and myalgias.   Skin:  Positive for rash. Negative for color change.   Neurological:  Negative for weakness and numbness.       PHYSICAL EXAM  /85   Pulse 75   Temp 36.7 °C (98 °F) (Temporal)   Ht 1.651 m (5' 5\")   Wt 64.8 kg (142 lb 14.4 oz)   SpO2 99%   BMI 23.78 kg/m²   Depression: Not at risk (2024)    " PHQ-2    • PHQ-2 Score: 0     Physical Exam  Vitals reviewed.   Constitutional:       General: She is not in acute distress.     Appearance: Normal appearance.   HENT:      Head: Normocephalic and atraumatic.   Eyes:      Conjunctiva/sclera: Conjunctivae normal.   Pulmonary:      Effort: Pulmonary effort is normal. No respiratory distress.   Musculoskeletal:         General: No swelling, tenderness or deformity.      Right lower leg: No edema.      Left lower leg: No edema.      Comments: No synovitis of examined joints   Skin:     Findings: Erythema present. No rash.      Comments: Malar rash; no other rashes   Neurological:      General: No focal deficit present.      Mental Status: She is alert.   Psychiatric:         Mood and Affect: Mood normal.       Health Maintenance Due   Topic Date Due   • Medicare Annual Wellness Visit (AWV)  Never done   • Hepatitis C Screening  Never done   • Zoster Vaccines (1 of 2) Never done   • RSV Pregnant patients and/or  patients aged 60+ years (1 - 1-dose 60+ series) Never done   • Diabetes Screening  10/31/2020   • Influenza Vaccine (1) 09/01/2024       Assessment/plan  Assessment & Plan  Systemic lupus erythematosus, unspecified SLE type, unspecified organ involvement status (Multi)  Lupus is stable.   No signs of disease progression on exam.   Pt to use sunscreen more regularly.  Labs ordered today to monitor for increased disease activity, end organ manifestations and to monitor for any drug toxicities due to chronic medications    Orders:  •  Anti-DNA Antibody, Double-Stranded; Future  •  C3 Complement; Future  •  C4 Complement; Future  •  CBC and Auto Differential; Future  •  Comprehensive Metabolic Panel; Future  •  C-Reactive Protein; Future  •  Sedimentation Rate; Future    Encounter for monitoring of hydroxychloroquine therapy  You are on chronic plaquenil.     Make sure you see your eye doctor yearly.---last visit May 2024  Plaquenil is dosed based on your weight.    We will make sure your dose is below the maximum daily dose of 5mg/kg         Need for vaccination    Orders:  •  Flu vaccine, trivalent, preservative free, HIGH-DOSE, age 65y+ (Fluzone)      Follow up: ___6__months    Immunization History   Administered Date(s) Administered   • Flu vaccine (IIV4), preservative free *Check age/dose* 11/18/2022   • Flu vaccine, quadrivalent, high-dose, preservative free, age 65y+ (FLUZONE) 12/20/2023   • Pneumococcal conjugate vaccine, 20-valent (PREVNAR 20) 08/09/2022   • Tdap vaccine, age 7 year and older (BOOSTRIX, ADACEL) 08/29/2018

## 2024-09-26 LAB
ALBUMIN SERPL BCP-MCNC: 4.6 G/DL (ref 3.4–5)
ALP SERPL-CCNC: 44 U/L (ref 33–136)
ALT SERPL W P-5'-P-CCNC: 22 U/L (ref 7–45)
ANION GAP SERPL CALC-SCNC: 12 MMOL/L (ref 10–20)
AST SERPL W P-5'-P-CCNC: 24 U/L (ref 9–39)
BASOPHILS # BLD AUTO: 0.06 X10*3/UL (ref 0–0.1)
BASOPHILS NFR BLD AUTO: 1.3 %
BILIRUB SERPL-MCNC: 0.5 MG/DL (ref 0–1.2)
BUN SERPL-MCNC: 12 MG/DL (ref 6–23)
C3 SERPL-MCNC: 113 MG/DL (ref 87–200)
C4 SERPL-MCNC: 20 MG/DL (ref 10–50)
CALCIUM SERPL-MCNC: 9.8 MG/DL (ref 8.6–10.6)
CHLORIDE SERPL-SCNC: 98 MMOL/L (ref 98–107)
CO2 SERPL-SCNC: 28 MMOL/L (ref 21–32)
CREAT SERPL-MCNC: 0.9 MG/DL (ref 0.5–1.05)
CRP SERPL-MCNC: <0.1 MG/DL
DSDNA AB SER-ACNC: 3 IU/ML
EGFRCR SERPLBLD CKD-EPI 2021: 71 ML/MIN/1.73M*2
EOSINOPHIL # BLD AUTO: 0.17 X10*3/UL (ref 0–0.7)
EOSINOPHIL NFR BLD AUTO: 3.8 %
ERYTHROCYTE [DISTWIDTH] IN BLOOD BY AUTOMATED COUNT: 11.8 % (ref 11.5–14.5)
ERYTHROCYTE [SEDIMENTATION RATE] IN BLOOD BY WESTERGREN METHOD: 8 MM/H (ref 0–30)
GLUCOSE SERPL-MCNC: 93 MG/DL (ref 74–99)
HCT VFR BLD AUTO: 37.3 % (ref 36–46)
HGB BLD-MCNC: 12.4 G/DL (ref 12–16)
IMM GRANULOCYTES # BLD AUTO: 0.01 X10*3/UL (ref 0–0.7)
IMM GRANULOCYTES NFR BLD AUTO: 0.2 % (ref 0–0.9)
LYMPHOCYTES # BLD AUTO: 1.86 X10*3/UL (ref 1.2–4.8)
LYMPHOCYTES NFR BLD AUTO: 41.8 %
MCH RBC QN AUTO: 30 PG (ref 26–34)
MCHC RBC AUTO-ENTMCNC: 33.2 G/DL (ref 32–36)
MCV RBC AUTO: 90 FL (ref 80–100)
MONOCYTES # BLD AUTO: 0.36 X10*3/UL (ref 0.1–1)
MONOCYTES NFR BLD AUTO: 8.1 %
NEUTROPHILS # BLD AUTO: 1.99 X10*3/UL (ref 1.2–7.7)
NEUTROPHILS NFR BLD AUTO: 44.8 %
NRBC BLD-RTO: 0 /100 WBCS (ref 0–0)
PLATELET # BLD AUTO: 254 X10*3/UL (ref 150–450)
POTASSIUM SERPL-SCNC: 4.1 MMOL/L (ref 3.5–5.3)
PROT SERPL-MCNC: 7.2 G/DL (ref 6.4–8.2)
RBC # BLD AUTO: 4.13 X10*6/UL (ref 4–5.2)
SODIUM SERPL-SCNC: 134 MMOL/L (ref 136–145)
WBC # BLD AUTO: 4.5 X10*3/UL (ref 4.4–11.3)

## 2024-12-09 ENCOUNTER — TELEPHONE (OUTPATIENT)
Dept: PRIMARY CARE | Facility: CLINIC | Age: 64
End: 2024-12-09
Payer: COMMERCIAL

## 2024-12-09 DIAGNOSIS — K14.6 BURNING MOUTH SYNDROME: ICD-10-CM

## 2024-12-09 RX ORDER — GABAPENTIN 300 MG/1
600 CAPSULE ORAL
Qty: 90 CAPSULE | Refills: 11 | Status: SHIPPED | OUTPATIENT
Start: 2024-12-09 | End: 2025-12-09

## 2024-12-09 NOTE — TELEPHONE ENCOUNTER
Pt called for a refill on gabapentin (Neurontin) 300 mg capsule     New pharmacy, why she called early         [facility] [place] [id number] [address]  Phone: [phone] Fax: [phone]

## 2025-03-25 PROBLEM — Z23 NEED FOR VACCINATION: Status: RESOLVED | Noted: 2024-09-25 | Resolved: 2025-03-25

## 2025-03-25 RX ORDER — DICYCLOMINE HYDROCHLORIDE 10 MG/1
10 CAPSULE ORAL 4 TIMES DAILY
COMMUNITY
Start: 2024-10-28

## 2025-03-26 ENCOUNTER — APPOINTMENT (OUTPATIENT)
Dept: RHEUMATOLOGY | Facility: CLINIC | Age: 65
End: 2025-03-26
Payer: COMMERCIAL

## 2025-03-26 VITALS
HEART RATE: 81 BPM | DIASTOLIC BLOOD PRESSURE: 88 MMHG | HEIGHT: 65 IN | BODY MASS INDEX: 23.22 KG/M2 | OXYGEN SATURATION: 98 % | TEMPERATURE: 97.8 F | WEIGHT: 139.4 LBS | SYSTOLIC BLOOD PRESSURE: 141 MMHG

## 2025-03-26 DIAGNOSIS — Z51.81 ENCOUNTER FOR MONITORING OF HYDROXYCHLOROQUINE THERAPY: ICD-10-CM

## 2025-03-26 DIAGNOSIS — R79.9 ABNORMAL FINDING OF BLOOD CHEMISTRY, UNSPECIFIED: ICD-10-CM

## 2025-03-26 DIAGNOSIS — G43.019 INTRACTABLE MIGRAINE WITHOUT AURA AND WITHOUT STATUS MIGRAINOSUS: ICD-10-CM

## 2025-03-26 DIAGNOSIS — M32.9 SYSTEMIC LUPUS ERYTHEMATOSUS, UNSPECIFIED SLE TYPE, UNSPECIFIED ORGAN INVOLVEMENT STATUS (MULTI): Primary | ICD-10-CM

## 2025-03-26 DIAGNOSIS — Z79.899 ENCOUNTER FOR MONITORING OF HYDROXYCHLOROQUINE THERAPY: ICD-10-CM

## 2025-03-26 PROCEDURE — 1123F ACP DISCUSS/DSCN MKR DOCD: CPT | Performed by: INTERNAL MEDICINE

## 2025-03-26 PROCEDURE — 1160F RVW MEDS BY RX/DR IN RCRD: CPT | Performed by: INTERNAL MEDICINE

## 2025-03-26 PROCEDURE — 1158F ADVNC CARE PLAN TLK DOCD: CPT | Performed by: INTERNAL MEDICINE

## 2025-03-26 PROCEDURE — 1036F TOBACCO NON-USER: CPT | Performed by: INTERNAL MEDICINE

## 2025-03-26 PROCEDURE — 3008F BODY MASS INDEX DOCD: CPT | Performed by: INTERNAL MEDICINE

## 2025-03-26 PROCEDURE — 99214 OFFICE O/P EST MOD 30 MIN: CPT | Performed by: INTERNAL MEDICINE

## 2025-03-26 PROCEDURE — 1159F MED LIST DOCD IN RCRD: CPT | Performed by: INTERNAL MEDICINE

## 2025-03-26 RX ORDER — SUMATRIPTAN SUCCINATE 50 MG/1
50 TABLET ORAL ONCE AS NEEDED
Qty: 9 TABLET | Refills: 3 | Status: SHIPPED | OUTPATIENT
Start: 2025-03-26 | End: 2026-03-26

## 2025-03-26 ASSESSMENT — ENCOUNTER SYMPTOMS
ARTHRALGIAS: 1
PSYCHIATRIC NEGATIVE: 1
NUMBNESS: 0
GASTROINTESTINAL NEGATIVE: 1
EYES NEGATIVE: 1
JOINT SWELLING: 0
COUGH: 0
COLOR CHANGE: 0
FEVER: 0
BACK PAIN: 0
SHORTNESS OF BREATH: 0
WEAKNESS: 0
HEADACHES: 1
ENDOCRINE NEGATIVE: 1
FATIGUE: 1
MYALGIAS: 0

## 2025-03-26 ASSESSMENT — PATIENT HEALTH QUESTIONNAIRE - PHQ9
1. LITTLE INTEREST OR PLEASURE IN DOING THINGS: NOT AT ALL
SUM OF ALL RESPONSES TO PHQ9 QUESTIONS 1 AND 2: 0
2. FEELING DOWN, DEPRESSED OR HOPELESS: NOT AT ALL

## 2025-03-26 NOTE — ASSESSMENT & PLAN NOTE
You are on chronic plaquenil.     Make sure you see your eye doctor yearly.  Due in May  Plaquenil is dosed based on your weight.   We will make sure your dose is below the maximum daily dose of 5mg/kg

## 2025-03-26 NOTE — ASSESSMENT & PLAN NOTE
On plaquenil therapy.   No signs of disease progression on exam today  Labs ordered today to monitor for increased disease activity, end organ manifestations and to monitor for any drug toxicities due to chronic medications    Orders:    Anti-DNA Antibody, Double-Stranded; Future    C3 Complement; Future    C4 Complement; Future    CBC and Auto Differential; Future    Comprehensive Metabolic Panel; Future    C-Reactive Protein; Future    Sedimentation Rate; Future    Protein, Urine Random; Future    Hemoglobin A1C; Future    Follow Up In Rheumatology; Future

## 2025-03-26 NOTE — LETTER
2025     Mattie Escamilla MD  [address] [facility] [address]    Patient: Romana Hogan   YOB: 1960   Date of Visit: 3/26/2025       Dear Dr. Mattie Escamilla MD:    Thank you for referring Romana Hogan to me for evaluation. Below are my notes for this visit  If you have questions, please do not hesitate to call me. I look forward to following your patient along with you.       Sincerely,     Leah Galindo MD      CC: No Recipients  ______________________________________________________________________________________                                                    [facility] RHEUMATOLOGY     AND INTERNAL MEDICINE    RHEUMATOLOGY PROGRESS NOTE    Romana Hogan 64 y.o. female  :  1960  PCP:  Mattie Escamilla MD    Chief Complaint   Patient presents with   • Lupus       SUBJECTIVE  Pt reports that she is doing well.  No worsening joint pain or rashes.   Notes migraines with changes in barometric pressure and wonders if there is anything she can take for that.  When she gets a migraine, taking 2-3 days to recover.  No other new symptoms  Is tired but attributes to watching her grandkids 4 days a week      Records since last seen reviewed in [facility], [facility] and Community Record  Patient Active Problem List    Diagnosis Date Noted   • Combined forms of age-related cataract, right eye 2024   • Burning mouth syndrome 2023   • Gastroesophageal reflux disease 2023   • Interstitial cystitis 2023   • Peripheral neuropathy 2023   • SS-A antibody positive 2023   • Systemic lupus erythematosus (Multi) 2023   • Trochanteric bursitis of left hip 2023   • Encounter for monitoring of hydroxychloroquine therapy 2023   • Rash and other nonspecific skin eruption 2018   • Sebaceous cyst 2018     Past Medical History:   Diagnosis Date   • Caffeine use    • COVID-19 vaccine series declined    • Need for vaccination  2024   • Pneumonia     History of pneumonia   • Sleep disorder     History of sleep disorder     Current Outpatient Medications on File Prior to Visit   Medication Sig Dispense Refill   • dicyclomine (Bentyl) 10 mg capsule Take 1 capsule (10 mg) by mouth 4 times a day.     • acetaminophen (Tylenol Extra Strength) 500 mg tablet Take by mouth.     • fluticasone (Flonase) 50 mcg/actuation nasal spray Administer 2 sprays into each nostril once daily.     • gabapentin (Neurontin) 300 mg capsule Take 2 capsules (600 mg) by mouth once daily in the morning. Take before meals. And 1 tablet in evening 90 capsule 11   • hydroxychloroquine (Plaquenil) 200 mg tablet Take 1 tablet (200 mg) by mouth once daily. 90 tablet 3   • pantoprazole (Protonix) 40 mg EC tablet Take 1 tablet (40 mg) by mouth once daily.     • triamcinolone (Kenalog) 0.1 % cream Apply topically 2 times a day.       No current facility-administered medications on file prior to visit.     Allergies   Allergen Reactions   • Pollen Extracts Other     Sneezing, watery eyes   • Latex Rash     Social History     Tobacco Use   • Smoking status: Former     Current packs/day: 0.00     Types: Cigarettes     Quit date: 1984     Years since quittin.5   • Smokeless tobacco: Never   Vaping Use   • Vaping status: Never Used   Substance Use Topics   • Alcohol use: Never   • Drug use: Never     Review of Systems   Constitutional:  Positive for fatigue. Negative for fever.   HENT: Negative.     Eyes: Negative.    Respiratory:  Negative for cough and shortness of breath.    Cardiovascular:  Negative for leg swelling.   Gastrointestinal: Negative.    Endocrine: Negative.    Genitourinary: Negative.    Musculoskeletal:  Positive for arthralgias. Negative for back pain, gait problem, joint swelling and myalgias.   Skin:  Negative for color change and rash.   Neurological:  Positive for headaches. Negative for weakness and numbness.   Psychiatric/Behavioral: Negative.  "        PHYSICAL EXAM  /88   Pulse 81   Temp 36.6 °C (97.8 °F)   Ht 1.651 m (5' 5\")   Wt 63.2 kg (139 lb 6.4 oz)   SpO2 98%   BMI 23.20 kg/m²   Depression: Not at risk (3/26/2025)    PHQ-2    • PHQ-2 Score: 0     Physical Exam  Vitals reviewed.   Constitutional:       General: She is not in acute distress.     Appearance: Normal appearance.   HENT:      Head: Normocephalic and atraumatic.   Eyes:      General: No scleral icterus.     Extraocular Movements: Extraocular movements intact.      Conjunctiva/sclera: Conjunctivae normal.      Pupils: Pupils are equal, round, and reactive to light.   Neck:      Vascular: No carotid bruit.   Cardiovascular:      Rate and Rhythm: Normal rate and regular rhythm.      Pulses: Normal pulses.      Heart sounds: Normal heart sounds. No murmur heard.     No friction rub. No gallop.   Pulmonary:      Effort: Pulmonary effort is normal. No respiratory distress.      Breath sounds: Normal breath sounds.   Musculoskeletal:         General: No swelling, tenderness or deformity.      Cervical back: Normal range of motion.      Right lower leg: No edema.      Left lower leg: No edema.      Comments: No synovitis of examined joints   Skin:     Findings: No erythema or rash.   Neurological:      General: No focal deficit present.      Mental Status: She is alert.   Psychiatric:         Mood and Affect: Mood normal.           Health Maintenance Due   Topic Date Due   • Medicare Annual Wellness Visit (AWV)  Never done   • Hepatitis C Screening  Never done   • Zoster Vaccines (1 of 2) Never done   • Diabetes Screening  10/31/2020   • Mammogram  06/23/2022       Assessment/plan  Assessment & Plan  Systemic lupus erythematosus, unspecified SLE type, unspecified organ involvement status (Multi)  On plaquenil therapy.   No signs of disease progression on exam today  Labs ordered today to monitor for increased disease activity, end organ manifestations and to monitor for any drug " toxicities due to chronic medications    Orders:  •  Anti-DNA Antibody, Double-Stranded; Future  •  C3 Complement; Future  •  C4 Complement; Future  •  CBC and Auto Differential; Future  •  Comprehensive Metabolic Panel; Future  •  C-Reactive Protein; Future  •  Sedimentation Rate; Future  •  Protein, Urine Random; Future  •  Hemoglobin A1C; Future  •  Follow Up In Rheumatology; Future    Encounter for monitoring of hydroxychloroquine therapy  You are on chronic plaquenil.     Make sure you see your eye doctor yearly.  Due in May  Plaquenil is dosed based on your weight.   We will make sure your dose is below the maximum daily dose of 5mg/kg         Abnormal finding of blood chemistry, unspecified    Orders:  •  Hemoglobin A1C; Future    Intractable migraine without aura and without status migrainosus  Trial of imitrex and pt will let me know how this helps  Orders:  •  SUMAtriptan (Imitrex) 50 mg tablet; Take 1 tablet (50 mg) by mouth 1 time if needed for migraine. May repeat dose once in 2 hours if no relief.  Do not exceed 2 doses in 24 hours.      Follow up: ____6_months, sooner if change in symptoms    Immunization History   Administered Date(s) Administered   • Flu vaccine (IIV4), preservative free *Check age/dose* 11/18/2022   • Flu vaccine, quadrivalent, high-dose, preservative free, age 65y+ (FLUZONE) 12/20/2023   • Flu vaccine, trivalent, preservative free, HIGH-DOSE, age 65y+ (Fluzone) 09/25/2024   • Pneumococcal conjugate vaccine, 20-valent (PREVNAR 20) 08/09/2022   • Tdap vaccine, age 7 year and older (BOOSTRIX, ADACEL) 08/29/2018

## 2025-03-26 NOTE — PATIENT INSTRUCTIONS

## 2025-03-26 NOTE — PROGRESS NOTES
[facility] RHEUMATOLOGY     AND INTERNAL MEDICINE    RHEUMATOLOGY PROGRESS NOTE    Romana Hogan 64 y.o. female  :  1960  PCP:  Mattie Escamilla MD    Chief Complaint   Patient presents with    Lupus       SUBJECTIVE  Pt reports that she is doing well.  No worsening joint pain or rashes.   Notes migraines with changes in barometric pressure and wonders if there is anything she can take for that.  When she gets a migraine, taking 2-3 days to recover.  No other new symptoms  Is tired but attributes to watching her grandkids 4 days a week      Records since last seen reviewed in [facility], [facility] and [facility] Record  Patient Active Problem List    Diagnosis Date Noted    Combined forms of age-related cataract, right eye 2024    Burning mouth syndrome 2023    Gastroesophageal reflux disease 2023    Interstitial cystitis 2023    Peripheral neuropathy 2023    SS-A antibody positive 2023    Systemic lupus erythematosus (Multi) 2023    Trochanteric bursitis of left hip 2023    Encounter for monitoring of hydroxychloroquine therapy 2023    Rash and other nonspecific skin eruption 2018    Sebaceous cyst 2018     Past Medical History:   Diagnosis Date    Caffeine use     COVID-19 vaccine series declined     Need for vaccination 2024    Pneumonia     History of pneumonia    Sleep disorder     History of sleep disorder     Current Outpatient Medications on File Prior to Visit   Medication Sig Dispense Refill    dicyclomine (Bentyl) 10 mg capsule Take 1 capsule (10 mg) by mouth 4 times a day.      acetaminophen (Tylenol Extra Strength) 500 mg tablet Take by mouth.      fluticasone (Flonase) 50 mcg/actuation nasal spray Administer 2 sprays into each nostril once daily.      gabapentin (Neurontin) 300 mg capsule Take 2 capsules (600 mg) by mouth once daily in the morning. Take before meals. And  "1 tablet in evening 90 capsule 11    hydroxychloroquine (Plaquenil) 200 mg tablet Take 1 tablet (200 mg) by mouth once daily. 90 tablet 3    pantoprazole (Protonix) 40 mg EC tablet Take 1 tablet (40 mg) by mouth once daily.      triamcinolone (Kenalog) 0.1 % cream Apply topically 2 times a day.       No current facility-administered medications on file prior to visit.     Allergies   Allergen Reactions    Pollen Extracts Other     Sneezing, watery eyes    Latex Rash     Social History     Tobacco Use    Smoking status: Former     Current packs/day: 0.00     Types: Cigarettes     Quit date: 1984     Years since quittin.5    Smokeless tobacco: Never   Vaping Use    Vaping status: Never Used   Substance Use Topics    Alcohol use: Never    Drug use: Never     Review of Systems   Constitutional:  Positive for fatigue. Negative for fever.   HENT: Negative.     Eyes: Negative.    Respiratory:  Negative for cough and shortness of breath.    Cardiovascular:  Negative for leg swelling.   Gastrointestinal: Negative.    Endocrine: Negative.    Genitourinary: Negative.    Musculoskeletal:  Positive for arthralgias. Negative for back pain, gait problem, joint swelling and myalgias.   Skin:  Negative for color change and rash.   Neurological:  Positive for headaches. Negative for weakness and numbness.   Psychiatric/Behavioral: Negative.         PHYSICAL EXAM  /88   Pulse 81   Temp 36.6 °C (97.8 °F)   Ht 1.651 m (5' 5\")   Wt 63.2 kg (139 lb 6.4 oz)   SpO2 98%   BMI 23.20 kg/m²   Depression: Not at risk (3/26/2025)    PHQ-2     PHQ-2 Score: 0     Physical Exam  Vitals reviewed.   Constitutional:       General: She is not in acute distress.     Appearance: Normal appearance.   HENT:      Head: Normocephalic and atraumatic.   Eyes:      General: No scleral icterus.     Extraocular Movements: Extraocular movements intact.      Conjunctiva/sclera: Conjunctivae normal.      Pupils: Pupils are equal, round, and " reactive to light.   Neck:      Vascular: No carotid bruit.   Cardiovascular:      Rate and Rhythm: Normal rate and regular rhythm.      Pulses: Normal pulses.      Heart sounds: Normal heart sounds. No murmur heard.     No friction rub. No gallop.   Pulmonary:      Effort: Pulmonary effort is normal. No respiratory distress.      Breath sounds: Normal breath sounds.   Musculoskeletal:         General: No swelling, tenderness or deformity.      Cervical back: Normal range of motion.      Right lower leg: No edema.      Left lower leg: No edema.      Comments: No synovitis of examined joints   Skin:     Findings: No erythema or rash.   Neurological:      General: No focal deficit present.      Mental Status: She is alert.   Psychiatric:         Mood and Affect: Mood normal.           Health Maintenance Due   Topic Date Due    Medicare Annual Wellness Visit (AWV)  Never done    Hepatitis C Screening  Never done    Zoster Vaccines (1 of 2) Never done    Diabetes Screening  10/31/2020    Mammogram  06/23/2022       Assessment/plan  Assessment & Plan  Systemic lupus erythematosus, unspecified SLE type, unspecified organ involvement status (Multi)  On plaquenil therapy.   No signs of disease progression on exam today  Labs ordered today to monitor for increased disease activity, end organ manifestations and to monitor for any drug toxicities due to chronic medications    Orders:    Anti-DNA Antibody, Double-Stranded; Future    C3 Complement; Future    C4 Complement; Future    CBC and Auto Differential; Future    Comprehensive Metabolic Panel; Future    C-Reactive Protein; Future    Sedimentation Rate; Future    Protein, Urine Random; Future    Hemoglobin A1C; Future    Follow Up In Rheumatology; Future    Encounter for monitoring of hydroxychloroquine therapy  You are on chronic plaquenil.     Make sure you see your eye doctor yearly.  Due in May  Plaquenil is dosed based on your weight.   We will make sure your dose is  below the maximum daily dose of 5mg/kg         Abnormal finding of blood chemistry, unspecified    Orders:    Hemoglobin A1C; Future    Intractable migraine without aura and without status migrainosus  Trial of imitrex and pt will let me know how this helps  Orders:    SUMAtriptan (Imitrex) 50 mg tablet; Take 1 tablet (50 mg) by mouth 1 time if needed for migraine. May repeat dose once in 2 hours if no relief.  Do not exceed 2 doses in 24 hours.      Follow up: ____6_months, sooner if change in symptoms    Immunization History   Administered Date(s) Administered    Flu vaccine (IIV4), preservative free *Check age/dose* 11/18/2022    Flu vaccine, quadrivalent, high-dose, preservative free, age 65y+ (FLUZONE) 12/20/2023    Flu vaccine, trivalent, preservative free, HIGH-DOSE, age 65y+ (Fluzone) 09/25/2024    Pneumococcal conjugate vaccine, 20-valent (PREVNAR 20) 08/09/2022    Tdap vaccine, age 7 year and older (BOOSTRIX, ADACEL) 08/29/2018

## 2025-03-28 LAB
ALBUMIN SERPL-MCNC: 4.9 G/DL (ref 3.6–5.1)
ALP SERPL-CCNC: 47 U/L (ref 37–153)
ALT SERPL-CCNC: 19 U/L (ref 6–29)
ANION GAP SERPL CALCULATED.4IONS-SCNC: 8 MMOL/L (CALC) (ref 7–17)
AST SERPL-CCNC: 20 U/L (ref 10–35)
BASOPHILS # BLD AUTO: 48 CELLS/UL (ref 0–200)
BASOPHILS NFR BLD AUTO: 1.2 %
BILIRUB SERPL-MCNC: 0.6 MG/DL (ref 0.2–1.2)
BUN SERPL-MCNC: 12 MG/DL (ref 7–25)
C3 SERPL-MCNC: 105 MG/DL (ref 83–193)
C4 SERPL-MCNC: 15 MG/DL (ref 15–57)
CALCIUM SERPL-MCNC: 10 MG/DL (ref 8.6–10.4)
CHLORIDE SERPL-SCNC: 96 MMOL/L (ref 98–110)
CO2 SERPL-SCNC: 28 MMOL/L (ref 20–32)
CREAT SERPL-MCNC: 0.94 MG/DL (ref 0.5–1.05)
CREAT UR-MCNC: 77 MG/DL (ref 20–275)
CRP SERPL-MCNC: <3 MG/L
DSDNA AB SER-ACNC: 4 IU/ML
EGFRCR SERPLBLD CKD-EPI 2021: 67 ML/MIN/1.73M2
EOSINOPHIL # BLD AUTO: 128 CELLS/UL (ref 15–500)
EOSINOPHIL NFR BLD AUTO: 3.2 %
ERYTHROCYTE [DISTWIDTH] IN BLOOD BY AUTOMATED COUNT: 11.8 % (ref 11–15)
ERYTHROCYTE [SEDIMENTATION RATE] IN BLOOD BY WESTERGREN METHOD: 2 MM/H
EST. AVERAGE GLUCOSE BLD GHB EST-MCNC: 105 MG/DL
EST. AVERAGE GLUCOSE BLD GHB EST-SCNC: 5.8 MMOL/L
GLUCOSE SERPL-MCNC: 94 MG/DL (ref 65–139)
HBA1C MFR BLD: 5.3 % OF TOTAL HGB
HCT VFR BLD AUTO: 40.7 % (ref 35–45)
HGB BLD-MCNC: 13.8 G/DL (ref 11.7–15.5)
LYMPHOCYTES # BLD AUTO: 1348 CELLS/UL (ref 850–3900)
LYMPHOCYTES NFR BLD AUTO: 33.7 %
MCH RBC QN AUTO: 30.7 PG (ref 27–33)
MCHC RBC AUTO-ENTMCNC: 33.9 G/DL (ref 32–36)
MCV RBC AUTO: 90.4 FL (ref 80–100)
MONOCYTES # BLD AUTO: 356 CELLS/UL (ref 200–950)
MONOCYTES NFR BLD AUTO: 8.9 %
NEUTROPHILS # BLD AUTO: 2120 CELLS/UL (ref 1500–7800)
NEUTROPHILS NFR BLD AUTO: 53 %
PLATELET # BLD AUTO: 247 THOUSAND/UL (ref 140–400)
PMV BLD REES-ECKER: 9.8 FL (ref 7.5–12.5)
POTASSIUM SERPL-SCNC: 4.4 MMOL/L (ref 3.5–5.3)
PROT SERPL-MCNC: 7.8 G/DL (ref 6.1–8.1)
PROT UR-MCNC: 6 MG/DL (ref 5–24)
PROT/CREAT UR: 0.08 MG/MG CREAT (ref 0.02–0.18)
PROT/CREAT UR: 78 MG/G CREAT (ref 24–184)
RBC # BLD AUTO: 4.5 MILLION/UL (ref 3.8–5.1)
SODIUM SERPL-SCNC: 132 MMOL/L (ref 135–146)
WBC # BLD AUTO: 4 THOUSAND/UL (ref 3.8–10.8)

## 2025-05-01 ENCOUNTER — TELEPHONE (OUTPATIENT)
Dept: RHEUMATOLOGY | Facility: CLINIC | Age: 65
End: 2025-05-01
Payer: COMMERCIAL

## 2025-05-01 DIAGNOSIS — M32.9 SYSTEMIC LUPUS ERYTHEMATOSUS, UNSPECIFIED SLE TYPE, UNSPECIFIED ORGAN INVOLVEMENT STATUS (MULTI): ICD-10-CM

## 2025-05-01 RX ORDER — HYDROXYCHLOROQUINE SULFATE 200 MG/1
200 TABLET, FILM COATED ORAL DAILY
Qty: 90 TABLET | Refills: 3 | Status: SHIPPED | OUTPATIENT
Start: 2025-05-01 | End: 2026-05-01

## 2025-10-02 ENCOUNTER — APPOINTMENT (OUTPATIENT)
Dept: RHEUMATOLOGY | Facility: CLINIC | Age: 65
End: 2025-10-02
Payer: COMMERCIAL